# Patient Record
Sex: MALE | Race: WHITE | URBAN - METROPOLITAN AREA
[De-identification: names, ages, dates, MRNs, and addresses within clinical notes are randomized per-mention and may not be internally consistent; named-entity substitution may affect disease eponyms.]

---

## 2018-11-01 ENCOUNTER — HOSPITAL ENCOUNTER (OUTPATIENT)
Age: 33
Setting detail: SPECIMEN
Discharge: HOME | End: 2018-11-01
Payer: COMMERCIAL

## 2018-11-01 ENCOUNTER — OFFICE VISIT (OUTPATIENT)
Dept: FAMILY MEDICINE CLINIC | Age: 33
End: 2018-11-01
Payer: COMMERCIAL

## 2018-11-01 VITALS
WEIGHT: 187 LBS | DIASTOLIC BLOOD PRESSURE: 102 MMHG | OXYGEN SATURATION: 98 % | BODY MASS INDEX: 26.77 KG/M2 | HEART RATE: 103 BPM | HEIGHT: 70 IN | SYSTOLIC BLOOD PRESSURE: 148 MMHG | TEMPERATURE: 98.2 F | RESPIRATION RATE: 16 BRPM

## 2018-11-01 DIAGNOSIS — Z00.00 WELL ADULT EXAM: ICD-10-CM

## 2018-11-01 DIAGNOSIS — F41.9 ANXIETY: ICD-10-CM

## 2018-11-01 DIAGNOSIS — I10 ESSENTIAL HYPERTENSION: ICD-10-CM

## 2018-11-01 DIAGNOSIS — I10 ESSENTIAL HYPERTENSION: Primary | ICD-10-CM

## 2018-11-01 DIAGNOSIS — G47.09 OTHER INSOMNIA: ICD-10-CM

## 2018-11-01 DIAGNOSIS — R94.31 ABNORMAL EKG: ICD-10-CM

## 2018-11-01 DIAGNOSIS — R00.0 TACHYCARDIA: ICD-10-CM

## 2018-11-01 LAB
ABSOLUTE EOS #: 0.17 K/UL (ref 0–0.44)
ABSOLUTE IMMATURE GRANULOCYTE: <0.03 K/UL (ref 0–0.3)
ABSOLUTE LYMPH #: 1.62 K/UL (ref 1.1–3.7)
ABSOLUTE MONO #: 0.89 K/UL (ref 0.1–1.2)
ALBUMIN SERPL-MCNC: 4.9 G/DL (ref 3.5–5.2)
ALBUMIN/GLOBULIN RATIO: 1.7 (ref 1–2.5)
ALP BLD-CCNC: 56 U/L (ref 40–129)
ALT SERPL-CCNC: 43 U/L (ref 5–41)
ANION GAP SERPL CALCULATED.3IONS-SCNC: 19 MMOL/L (ref 9–17)
AST SERPL-CCNC: 32 U/L
BASOPHILS # BLD: 0 % (ref 0–2)
BASOPHILS ABSOLUTE: 0.03 K/UL (ref 0–0.2)
BILIRUB SERPL-MCNC: 0.57 MG/DL (ref 0.3–1.2)
BILIRUBIN URINE: NEGATIVE
BUN BLDV-MCNC: 9 MG/DL (ref 6–20)
BUN/CREAT BLD: ABNORMAL (ref 9–20)
CALCIUM SERPL-MCNC: 9.6 MG/DL (ref 8.6–10.4)
CHLORIDE BLD-SCNC: 102 MMOL/L (ref 98–107)
CHOLESTEROL/HDL RATIO: 3
CHOLESTEROL: 203 MG/DL
CO2: 23 MMOL/L (ref 20–31)
COLOR: YELLOW
COMMENT UA: ABNORMAL
CREAT SERPL-MCNC: 0.72 MG/DL (ref 0.7–1.2)
DIFFERENTIAL TYPE: ABNORMAL
EOSINOPHILS RELATIVE PERCENT: 3 % (ref 1–4)
GFR AFRICAN AMERICAN: >60 ML/MIN
GFR NON-AFRICAN AMERICAN: >60 ML/MIN
GFR SERPL CREATININE-BSD FRML MDRD: ABNORMAL ML/MIN/{1.73_M2}
GFR SERPL CREATININE-BSD FRML MDRD: ABNORMAL ML/MIN/{1.73_M2}
GLUCOSE BLD-MCNC: 102 MG/DL (ref 70–99)
GLUCOSE URINE: NEGATIVE
HCT VFR BLD CALC: 47.5 % (ref 40.7–50.3)
HDLC SERPL-MCNC: 68 MG/DL
HEMOGLOBIN: 16.4 G/DL (ref 13–17)
IMMATURE GRANULOCYTES: 0 %
KETONES, URINE: ABNORMAL
LDL CHOLESTEROL: 107 MG/DL (ref 0–130)
LEUKOCYTE ESTERASE, URINE: NEGATIVE
LYMPHOCYTES # BLD: 24 % (ref 24–43)
MCH RBC QN AUTO: 31.1 PG (ref 25.2–33.5)
MCHC RBC AUTO-ENTMCNC: 34.5 G/DL (ref 28.4–34.8)
MCV RBC AUTO: 90.1 FL (ref 82.6–102.9)
MONOCYTES # BLD: 13 % (ref 3–12)
NITRITE, URINE: NEGATIVE
NRBC AUTOMATED: 0 PER 100 WBC
PDW BLD-RTO: 12 % (ref 11.8–14.4)
PH UA: 5.5 (ref 5–8)
PLATELET # BLD: 252 K/UL (ref 138–453)
PLATELET ESTIMATE: ABNORMAL
PMV BLD AUTO: 10.5 FL (ref 8.1–13.5)
POTASSIUM SERPL-SCNC: 4.6 MMOL/L (ref 3.7–5.3)
PROTEIN UA: NEGATIVE
RBC # BLD: 5.27 M/UL (ref 4.21–5.77)
RBC # BLD: ABNORMAL 10*6/UL
SEG NEUTROPHILS: 60 % (ref 36–65)
SEGMENTED NEUTROPHILS ABSOLUTE COUNT: 4.06 K/UL (ref 1.5–8.1)
SODIUM BLD-SCNC: 144 MMOL/L (ref 135–144)
SPECIFIC GRAVITY UA: 1.02 (ref 1–1.03)
TOTAL PROTEIN: 7.8 G/DL (ref 6.4–8.3)
TRIGL SERPL-MCNC: 142 MG/DL
TSH SERPL DL<=0.05 MIU/L-ACNC: 1.24 MIU/L (ref 0.3–5)
TURBIDITY: CLEAR
URINE HGB: NEGATIVE
UROBILINOGEN, URINE: NORMAL
VLDLC SERPL CALC-MCNC: ABNORMAL MG/DL (ref 1–30)
WBC # BLD: 6.8 K/UL (ref 3.5–11.3)
WBC # BLD: ABNORMAL 10*3/UL

## 2018-11-01 PROCEDURE — 93000 ELECTROCARDIOGRAM COMPLETE: CPT | Performed by: INTERNAL MEDICINE

## 2018-11-01 PROCEDURE — 99203 OFFICE O/P NEW LOW 30 MIN: CPT | Performed by: INTERNAL MEDICINE

## 2018-11-01 RX ORDER — BUSPIRONE HYDROCHLORIDE 7.5 MG/1
7.5 TABLET ORAL 3 TIMES DAILY PRN
Qty: 60 TABLET | Refills: 1 | Status: SHIPPED | OUTPATIENT
Start: 2018-11-01

## 2018-11-01 RX ORDER — LISINOPRIL 5 MG/1
5 TABLET ORAL DAILY
Qty: 30 TABLET | Refills: 3 | Status: SHIPPED | OUTPATIENT
Start: 2018-11-01

## 2018-11-01 ASSESSMENT — ENCOUNTER SYMPTOMS
SHORTNESS OF BREATH: 0
WHEEZING: 0
CONSTIPATION: 0
CHOKING: 0
CHEST TIGHTNESS: 0
ORTHOPNEA: 0
ABDOMINAL PAIN: 0
BLURRED VISION: 0
DIARRHEA: 0
STRIDOR: 0
COUGH: 0

## 2018-11-01 ASSESSMENT — PATIENT HEALTH QUESTIONNAIRE - PHQ9
2. FEELING DOWN, DEPRESSED OR HOPELESS: 0
SUM OF ALL RESPONSES TO PHQ9 QUESTIONS 1 & 2: 0
1. LITTLE INTEREST OR PLEASURE IN DOING THINGS: 0
SUM OF ALL RESPONSES TO PHQ QUESTIONS 1-9: 0
SUM OF ALL RESPONSES TO PHQ QUESTIONS 1-9: 0

## 2018-11-01 NOTE — PROGRESS NOTES
no rigidity and no guarding. No hernia. Lymphadenopathy:     He has no cervical adenopathy. Neurological: He is alert and oriented to person, place, and time. He has normal strength. No cranial nerve deficit or sensory deficit. Skin: Skin is warm and dry. No rash noted. Psychiatric: His mood appears anxious. His speech is delayed. Cognition and memory are normal. He is inattentive. Nursing note and vitals reviewed. BP (!) 148/102 (Site: Left Upper Arm, Position: Sitting, Cuff Size: Medium Adult)   Pulse 103   Temp 98.2 °F (36.8 °C) (Tympanic)   Resp 16   Ht 5' 10\" (1.778 m)   Wt 187 lb (84.8 kg)   SpO2 98%   BMI 26.83 kg/m²     Assessment:       Diagnosis Orders   1. Essential hypertension  CBC Auto Differential    Comprehensive Metabolic Panel    TSH with Reflex    Urinalysis    Lipid Panel   2. Other insomnia  TSH with Reflex   3. Anxiety  TSH with Reflex   4. Well adult exam     5. Tachycardia  EKG 12 Lead             Plan:      Return in about 4 weeks (around 11/29/2018), or if symptoms worsen or fail to improve, for bp check. Orders Placed This Encounter   Procedures    CBC Auto Differential     Standing Status:   Future     Number of Occurrences:   1     Standing Expiration Date:   11/1/2019    Comprehensive Metabolic Panel     Standing Status:   Future     Number of Occurrences:   1     Standing Expiration Date:   11/1/2019    TSH with Reflex     Standing Status:   Future     Number of Occurrences:   1     Standing Expiration Date:   11/1/2019    Urinalysis     Standing Status:   Future     Number of Occurrences:   1     Standing Expiration Date:   11/1/2019     Scheduling Instructions:      UTI symptoms     Order Specific Question:   SPECIFY(EX-CATH,MIDSTREAM,CYSTO,ETC)?      Answer:   clean catch    Lipid Panel     Standing Status:   Future     Number of Occurrences:   1     Standing Expiration Date:   11/1/2019     Order Specific Question:   Is Patient Fasting?/# of Hours

## 2019-02-07 RX ORDER — LOSARTAN POTASSIUM 50 MG/1
50 TABLET ORAL DAILY
Qty: 30 TABLET | Refills: 3 | Status: SHIPPED | OUTPATIENT
Start: 2019-02-07

## 2019-03-05 RX ORDER — CANDESARTAN 16 MG/1
16 TABLET ORAL DAILY
Qty: 30 TABLET | Refills: 5 | Status: SHIPPED | OUTPATIENT
Start: 2019-03-05

## 2021-10-12 ENCOUNTER — NURSE TRIAGE (OUTPATIENT)
Dept: OTHER | Facility: CLINIC | Age: 36
End: 2021-10-12

## 2021-10-12 NOTE — TELEPHONE ENCOUNTER
Reason for Disposition   SEVERE headache, states 'worst headache' of life    Answer Assessment - Initial Assessment Questions  1. LOCATION: \"Where does it hurt? \"     Left temple    2. ONSET: \"When did the headache start? \" (Minutes, hours or days)   Sunday     3. PATTERN: \"Does the pain come and go, or has it been constant since it started? \"     No     4. SEVERITY: \"How bad is the pain? \" and \"What does it keep you from doing? \"  (e.g., Scale 1-10; mild, moderate, or severe)    - MILD (1-3): doesn't interfere with normal activities     - MODERATE (4-7): interferes with normal activities or awakens from sleep     - SEVERE (8-10): excruciating pain, unable to do any normal activities        8  Out of 10     5. RECURRENT SYMPTOM: \"Have you ever had headaches before? \" If so, ask: \"When was the last time? \" and \"What happened that time? \"    No    6. CAUSE: \"What do you think is causing the headache? \"      At first thought it was covid. Also he has not had a drink since Wednesday last week. He use to drink daily. 7. MIGRAINE: \"Have you been diagnosed with migraine headaches? \" If so, ask: \"Is this headache similar? \"   No   8. HEAD INJURY: \"Has there been any recent injury to the head? \"       No     9. OTHER SYMPTOMS: \"Do you have any other symptoms? \" (fever, stiff neck, eye pain, sore throat, cold symptoms)   Fever- 99.2F    10. PREGNANCY: \"Is there any chance you are pregnant? \" \"When was your last menstrual period? \"  NA    Protocols used: HEADACHE-ADULT-[place]        Received call from [id number]  at [facility] with [employer]. Brief description of triage:  Please see notes above. Triage indicates for patient to go to [facility] or to Office with PCP Approval. Call to  Office of [facility]. Spoke with Daryl Luz who explained that provider is no longer on staff and patient has not been seen since 2018.  She advised that they have a walk in clinic if patient wants to come but cannot give advice on care as he has not been seen recently. Spoke with NP Christina Guajardo and she advised ER  Care. Explained that to spouse. Patient gave consent for RN to speak to spouse. Care advice provided, patient verbalizes understanding; denies any other questions or concerns; instructed to call back for any new or worsening symptoms. Attention Provider: Thank you for allowing me to participate in the care of your patient. The patient was connected to triage in response to information provided to the ECC/PSC. Please do not respond through this encounter as the response is not directed to a shared pool.

## 2024-05-14 RX ORDER — IVERMECTIN 3 MG/1
200 TABLET ORAL WEEKLY
Qty: 11 TABLET | Refills: 0 | Status: SHIPPED | OUTPATIENT
Start: 2024-05-14 | End: 2024-05-22

## 2024-05-15 ENCOUNTER — TELEPHONE (OUTPATIENT)
Dept: PULMONOLOGY | Age: 39
End: 2024-05-15

## 2024-05-15 NOTE — TELEPHONE ENCOUNTER
Received a fax for a PA on Ivermectin.  Unable to process this PA because of no records that this is a patient of ours.   SERGIO spoke with Hernandoam called and informed not our patient.